# Patient Record
Sex: MALE | Race: WHITE | Employment: FULL TIME | URBAN - METROPOLITAN AREA
[De-identification: names, ages, dates, MRNs, and addresses within clinical notes are randomized per-mention and may not be internally consistent; named-entity substitution may affect disease eponyms.]

---

## 2021-06-30 ENCOUNTER — HOSPITAL ENCOUNTER (EMERGENCY)
Facility: CLINIC | Age: 36
Discharge: HOME | End: 2021-06-30
Attending: EMERGENCY MEDICINE | Admitting: EMERGENCY MEDICINE
Payer: OTHER GOVERNMENT

## 2021-06-30 ENCOUNTER — APPOINTMENT (OUTPATIENT)
Dept: CT IMAGING | Facility: CLINIC | Age: 36
End: 2021-06-30
Attending: EMERGENCY MEDICINE
Payer: OTHER GOVERNMENT

## 2021-06-30 VITALS
RESPIRATION RATE: 14 BRPM | WEIGHT: 190 LBS | OXYGEN SATURATION: 98 % | DIASTOLIC BLOOD PRESSURE: 86 MMHG | TEMPERATURE: 97 F | HEART RATE: 98 BPM | SYSTOLIC BLOOD PRESSURE: 147 MMHG

## 2021-06-30 DIAGNOSIS — H72.92 OTITIS MEDIA OF LEFT EAR WITH RUPTURE OF TYMPANIC MEMBRANE: ICD-10-CM

## 2021-06-30 DIAGNOSIS — H66.92 OTITIS MEDIA OF LEFT EAR WITH RUPTURE OF TYMPANIC MEMBRANE: ICD-10-CM

## 2021-06-30 PROCEDURE — 70480 CT ORBIT/EAR/FOSSA W/O DYE: CPT

## 2021-06-30 PROCEDURE — 99284 EMERGENCY DEPT VISIT MOD MDM: CPT | Mod: 25 | Performed by: EMERGENCY MEDICINE

## 2021-06-30 PROCEDURE — 99284 EMERGENCY DEPT VISIT MOD MDM: CPT | Performed by: EMERGENCY MEDICINE

## 2021-06-30 RX ORDER — WHEY PROTEIN ISOLATE 100 %
2 POWDER (GRAM) MISCELLANEOUS DAILY PRN
COMMUNITY

## 2021-06-30 RX ORDER — OMEPRAZOLE 20 MG/1
20 TABLET, DELAYED RELEASE ORAL DAILY
COMMUNITY

## 2021-06-30 RX ORDER — VARENICLINE TARTRATE 1 MG/1
1 TABLET, FILM COATED ORAL 2 TIMES DAILY
COMMUNITY
Start: 2021-06-02 | End: 2021-06-30

## 2021-06-30 RX ORDER — OFLOXACIN 3 MG/ML
5 SOLUTION AURICULAR (OTIC) 2 TIMES DAILY
Qty: 5 ML | Refills: 0 | Status: SHIPPED | OUTPATIENT
Start: 2021-06-30 | End: 2021-07-07

## 2021-06-30 RX ORDER — MULTIVIT-MIN/FOLIC/VIT K/LYCOP 400-300MCG
1 TABLET ORAL EVERY OTHER DAY
COMMUNITY

## 2021-06-30 RX ORDER — IBUPROFEN 200 MG
600 TABLET ORAL EVERY 6 HOURS PRN
COMMUNITY

## 2021-06-30 NOTE — ED PROVIDER NOTES
History     Chief Complaint   Patient presents with     Otalgia     HPI  Yayo Betancourt is a 35 year old male who presents with left ear pain.  It has been present for 30 days since he flew home from [place].  Early this morning around 3 AM he had sudden onset of a popping sensation and fluid drainage from the ear.  It first started draining like old water then is become mixed more with some pus.  No fever.  He does describe some pain in the left mastoid.  Pain is an 8 out of 10.    Allergies:  No Known Allergies    Problem List:    There are no active problems to display for this patient.       Past Medical History:    History reviewed. No pertinent past medical history.    Past Surgical History:    History reviewed. No pertinent surgical history.    Family History:    No family history on file.    Social History:  Marital Status:    Social History     Tobacco Use     Smoking status: Current Every Day Smoker     Packs/day: 0.50     Smokeless tobacco: Never Used   Substance Use Topics     Alcohol use: Yes     Comment: ocassional     Drug use: None        Medications:    amoxicillin-clavulanate (AUGMENTIN) 875-125 MG tablet  ibuprofen (ADVIL/MOTRIN) 200 MG tablet  Multiple Vitamin (ONE-A-DAY MENS) TABS  ofloxacin (FLOXIN) 0.3 % otic solution  omeprazole (PRILOSEC OTC) 20 MG EC tablet  varenicline (CHANTIX TIA) 0.5 MG X 11 & 1 MG X 42 tablet  varenicline (CHANTIX) 1 MG tablet          Review of Systems  All other systems are reviewed and are negative    Physical Exam   BP: (!) 147/86  Pulse: 98  Temp: 97  F (36.1  C)  Resp: 14  Weight: 86.2 kg (190 lb)  SpO2: 98 %      Physical Exam  Vitals signs reviewed.   Constitutional:       General: He is not in acute distress.     Appearance: He is not diaphoretic.   HENT:      Head: Normocephalic and atraumatic.      Right Ear: Tympanic membrane normal.      Left Ear: Drainage present. Tympanic membrane is injected and erythematous.      Ears:      Comments: Left mastoid  is tender, 8 out of 10 to palpation.  There is no erythema or obvious swelling.  Eyes:      General: No scleral icterus.        Right eye: No discharge.         Left eye: No discharge.      Conjunctiva/sclera: Conjunctivae normal.   Neck:      Musculoskeletal: Normal range of motion.   Pulmonary:      Effort: Pulmonary effort is normal.      Breath sounds: No stridor.   Musculoskeletal: Normal range of motion.   Skin:     General: Skin is warm and dry.      Findings: No rash.   Neurological:      Mental Status: He is alert.      Comments: Normal speech and mentation   Psychiatric:         Judgment: Judgment normal.         ED Course        Procedures               Critical Care time:  none               Results for orders placed or performed during the hospital encounter of 06/30/21 (from the past 24 hour(s))   CT Temporal Bones wo Contrast    Narrative    CT OF THE TEMPORAL BONES WITHOUT CONTRAST 6/30/2021 4:56 PM     HISTORY: Mastoiditis.    COMPARISON: None.    TECHNIQUE: Thin-section axial CT images of the skull base were  acquired without intravenous contrast. Thin-section coronal  reconstructions were created from the axial source images.    FINDINGS:  Right Temporal Bone: The external auditory canal and tympanic membrane  are within normal limits. There is a high rating right jugular bulb  that intersects the vestibular aqueducts; this can be associated with  sensorineural hearing loss. The auditory ossicles are normal in  contour and alignment. The middle ear cavity and mastoid air cells are  well-aerated. The internal auditory canal, cochlea, semicircular  canals, vestibular aqueduct and bony canal for the facial nerve are  within normal limits. The scutum is normal in contour. The tegmen  tympani is intact.    Left Temporal Bone:  The external auditory canal is unremarkable.  There is thickening of the tympanic membrane. The auditory ossicles  are normal in contour and alignment. There is partial  opacification of  the middle ear cavity by fluid/inflammatory debris. There is partial  opacification of the mastoid air cells by fluid/inflammatory debris.  The internal auditory canal, cochlea, semicircular canals, vestibular  aqueduct and bony canal for the facial nerve are within normal limits.  The scutum is normal in contour. The tegmen tympani is intact.      Impression    IMPRESSION:  1. High-riding right jugular bulb that intersects the right vestibular  aqueduct. This can be associated with sensory neural hearing loss.  2. Otherwise, normal right temporal bone.  3. Partial opacification of the middle ear cavity and mastoid air  cells on the left that is likely inflammatory in nature.  4. Otherwise, normal left temporal bone.      Radiation dose for this scan was reduced using automated exposure  control, adjustment of the mA and/or kV according to patient size, or  iterative reconstruction technique    KING GLYNN MD          SYSTEM ID:  [id number]       Medications - No data to display    Assessments & Plan (with Medical Decision Making)  35-year-old male with otitis media and ruptured TM.  Some mastoid tenderness but no evidence for serious sequela I based on above CT.  Case was reviewed with Dr. Gimenez.  We will place him on the below antibiotics and he will see him in clinic in ~2 weeks in follow-up.     I have reviewed the nursing notes.    I have reviewed the findings, diagnosis, plan and need for follow up with the patient.       New Prescriptions    AMOXICILLIN-CLAVULANATE (AUGMENTIN) 875-125 MG TABLET    Take 1 tablet by mouth 2 times daily    OFLOXACIN (FLOXIN) 0.3 % OTIC SOLUTION    Place 5 drops in ear(s) 2 times daily for 7 days       Final diagnoses:   Otitis media of left ear with rupture of tympanic membrane       6/30/2021   [facility] EMERGENCY DEPT     Ghulam Carbone MD  06/30/21 0292

## 2021-06-30 NOTE — ED TRIAGE NOTES
Here with left ear pain and drainage. 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 States last night he felt a pop and it started drainage.

## 2023-02-11 ENCOUNTER — HEALTH MAINTENANCE LETTER (OUTPATIENT)
Age: 38
End: 2023-02-11

## 2024-03-09 ENCOUNTER — HEALTH MAINTENANCE LETTER (OUTPATIENT)
Age: 39
End: 2024-03-09

## 2025-06-08 ENCOUNTER — HEALTH MAINTENANCE LETTER (OUTPATIENT)
Age: 40
End: 2025-06-08

## 2025-06-08 ENCOUNTER — APPOINTMENT (OUTPATIENT)
Dept: CT IMAGING | Facility: CLINIC | Age: 40
End: 2025-06-08
Attending: EMERGENCY MEDICINE
Payer: COMMERCIAL

## 2025-06-08 ENCOUNTER — HOSPITAL ENCOUNTER (EMERGENCY)
Facility: CLINIC | Age: 40
Discharge: HOME | End: 2025-06-08
Attending: EMERGENCY MEDICINE | Admitting: EMERGENCY MEDICINE
Payer: COMMERCIAL

## 2025-06-08 VITALS
TEMPERATURE: 97.4 F | HEART RATE: 81 BPM | OXYGEN SATURATION: 95 % | SYSTOLIC BLOOD PRESSURE: 117 MMHG | DIASTOLIC BLOOD PRESSURE: 70 MMHG | RESPIRATION RATE: 16 BRPM

## 2025-06-08 DIAGNOSIS — F10.929 ALCOHOLIC INTOXICATION WITH COMPLICATION: ICD-10-CM

## 2025-06-08 DIAGNOSIS — R41.82 ALTERED MENTAL STATUS, UNSPECIFIED ALTERED MENTAL STATUS TYPE: ICD-10-CM

## 2025-06-08 DIAGNOSIS — E04.1 THYROID NODULE: ICD-10-CM

## 2025-06-08 LAB
ALBUMIN SERPL BCG-MCNC: 4.7 G/DL (ref 3.5–5.2)
ALP SERPL-CCNC: 58 U/L (ref 40–150)
ALT SERPL W P-5'-P-CCNC: 33 U/L (ref 0–70)
ANION GAP SERPL CALCULATED.3IONS-SCNC: 16 MMOL/L (ref 7–15)
AST SERPL W P-5'-P-CCNC: 32 U/L (ref 0–45)
BASOPHILS # BLD AUTO: 0 10E3/UL (ref 0–0.2)
BASOPHILS NFR BLD AUTO: 0 %
BILIRUB SERPL-MCNC: <0.2 MG/DL
BUN SERPL-MCNC: 16.5 MG/DL (ref 6–20)
CALCIUM SERPL-MCNC: 8.8 MG/DL (ref 8.8–10.4)
CHLORIDE SERPL-SCNC: 103 MMOL/L (ref 98–107)
CREAT SERPL-MCNC: 0.76 MG/DL (ref 0.67–1.17)
EGFRCR SERPLBLD CKD-EPI 2021: >90 ML/MIN/1.73M2
EOSINOPHIL # BLD AUTO: 0.1 10E3/UL (ref 0–0.7)
EOSINOPHIL NFR BLD AUTO: 1 %
ERYTHROCYTE [DISTWIDTH] IN BLOOD BY AUTOMATED COUNT: 12.5 % (ref 10–15)
ETHANOL SERPL-MCNC: 0.17 G/DL
GLUCOSE SERPL-MCNC: 101 MG/DL (ref 70–99)
HCO3 SERPL-SCNC: 23 MMOL/L (ref 22–29)
HCT VFR BLD AUTO: 39.4 % (ref 40–53)
HGB BLD-MCNC: 12.9 G/DL (ref 13.3–17.7)
HOLD SPECIMEN: NORMAL
IMM GRANULOCYTES # BLD: 0 10E3/UL
IMM GRANULOCYTES NFR BLD: 0 %
LYMPHOCYTES # BLD AUTO: 1.3 10E3/UL (ref 0.8–5.3)
LYMPHOCYTES NFR BLD AUTO: 12 %
MCH RBC QN AUTO: 31.5 PG (ref 26.5–33)
MCHC RBC AUTO-ENTMCNC: 32.7 G/DL (ref 31.5–36.5)
MCV RBC AUTO: 96 FL (ref 78–100)
MONOCYTES # BLD AUTO: 0.6 10E3/UL (ref 0–1.3)
MONOCYTES NFR BLD AUTO: 5 %
NEUTROPHILS # BLD AUTO: 9.5 10E3/UL (ref 1.6–8.3)
NEUTROPHILS NFR BLD AUTO: 82 %
NRBC # BLD AUTO: 0 10E3/UL
NRBC BLD AUTO-RTO: 0 /100
PLATELET # BLD AUTO: 237 10E3/UL (ref 150–450)
POTASSIUM SERPL-SCNC: 3.9 MMOL/L (ref 3.4–5.3)
PROT SERPL-MCNC: 7 G/DL (ref 6.4–8.3)
RBC # BLD AUTO: 4.09 10E6/UL (ref 4.4–5.9)
SODIUM SERPL-SCNC: 142 MMOL/L (ref 135–145)
TROPONIN T SERPL HS-MCNC: 7 NG/L
TROPONIN T SERPL HS-MCNC: <6 NG/L
WBC # BLD AUTO: 11.5 10E3/UL (ref 4–11)

## 2025-06-08 PROCEDURE — 80053 COMPREHEN METABOLIC PANEL: CPT | Performed by: EMERGENCY MEDICINE

## 2025-06-08 PROCEDURE — 36415 COLL VENOUS BLD VENIPUNCTURE: CPT | Performed by: EMERGENCY MEDICINE

## 2025-06-08 PROCEDURE — 70450 CT HEAD/BRAIN W/O DYE: CPT

## 2025-06-08 PROCEDURE — 84484 ASSAY OF TROPONIN QUANT: CPT | Performed by: EMERGENCY MEDICINE

## 2025-06-08 PROCEDURE — 72125 CT NECK SPINE W/O DYE: CPT

## 2025-06-08 PROCEDURE — 99284 EMERGENCY DEPT VISIT MOD MDM: CPT | Mod: 25

## 2025-06-08 PROCEDURE — 82077 ASSAY SPEC XCP UR&BREATH IA: CPT | Performed by: EMERGENCY MEDICINE

## 2025-06-08 PROCEDURE — 85025 COMPLETE CBC W/AUTO DIFF WBC: CPT | Performed by: EMERGENCY MEDICINE

## 2025-06-08 RX ORDER — BUPROPION HYDROCHLORIDE 150 MG/1
TABLET, EXTENDED RELEASE ORAL
COMMUNITY
Start: 2025-02-12

## 2025-06-08 RX ORDER — DULOXETIN HYDROCHLORIDE 60 MG/1
60 CAPSULE, DELAYED RELEASE ORAL DAILY
COMMUNITY

## 2025-06-08 RX ORDER — HYDROCORTISONE 25 MG/G
CREAM TOPICAL
COMMUNITY
Start: 2024-09-18

## 2025-06-08 ASSESSMENT — ACTIVITIES OF DAILY LIVING (ADL)
ADLS_ACUITY_SCORE: 41

## 2025-06-08 ASSESSMENT — COLUMBIA-SUICIDE SEVERITY RATING SCALE - C-SSRS
6. HAVE YOU EVER DONE ANYTHING, STARTED TO DO ANYTHING, OR PREPARED TO DO ANYTHING TO END YOUR LIFE?: NO
2. HAVE YOU ACTUALLY HAD ANY THOUGHTS OF KILLING YOURSELF IN THE PAST MONTH?: NO
1. IN THE PAST MONTH, HAVE YOU WISHED YOU WERE DEAD OR WISHED YOU COULD GO TO SLEEP AND NOT WAKE UP?: NO

## 2025-06-08 NOTE — ED NOTES
Pt changed out of scrubs.  Pt helping change clothing but keeps eyes closed and only makes sounds when talked to.

## 2025-06-08 NOTE — DISCHARGE INSTRUCTIONS
Discharge Instructions  Alcohol Intoxication    You have been seen today with alcohol intoxication. This means that you have enough alcohol in your system to impair your ability to mentally and physically function, perhaps to the extent that you were unable to care for yourself.    Generally, every Emergency Department visit should have a follow-up clinic visit with either a primary or a specialty clinic/provider. Please follow-up as instructed by your emergency provider today.    You may have come to the Emergency Department because of your intoxication, or for another reason, such as because of an injury. No matter what the case is, this visit is a  red flag  regarding alcohol use, and you should consider whether your drinking pattern is a problem for you.     You may be at risk for alcohol-related problems if:    Men: you drink more than 14 drinks per week, or more than 4 drinks per occasion.    Women: you drink more than 7 drinks per week or more than 3 drinks per occasion.    You have black-outs.  You do things you regret while drinking.  You have legal problems because of drinking.  You have job problems because of drinking (you call in sick to work because of drinking).    CAGE Questions  Have you ever felt you should cut down on your drinking?  Have people annoyed you by criticizing your drinking?  Have you ever felt bad or guilty about your drinking?  Have you ever had a drink first thing in the morning to steady your nerves or get rid of a hangover (eye opener)?    If you answer yes to any of the CAGE questions, you may have a problem with alcohol.      Return to the Emergency Department if:  You become shaky or tremble when you try to stop drinking.   You have severe abdominal pain (belly pain).   You have a seizure or pass out.    You vomit (throw up) blood or have blood in your stool. This may be bright red or it may look like black coffee grounds.  You become lightheaded or faint.      For further  help, contact:   Your caregiver.    Alcoholics Anonymous (AA).    [facility] Intergroup: [phone]  [facility] Central Office: [phone]   A drug or alcohol rehabilitation program.    You can get information on alcohol resources and groups by calling the number 211 or 1-119.613.6498 on any phone.     Seek medical care if:  You have persistent vomiting.   You have persistent pain in any part of your body.    You do not feel better after a few days.    If you were given a prescription for medicine here today, be sure to read all of the information (including the package insert) that comes with your prescription.  This will include important information about the medicine, its side effects, and any warnings that you need to know about.  The pharmacist who fills the prescription can provide more information and answer questions you may have about the medicine.  If you have questions or concerns that the pharmacist cannot address, please call or return to the Emergency Department.   Remember that you can always come back to the Emergency Department if you are not able to see your regular doctor in the amount of time listed above, if you get any new symptoms, or if there is anything that worries you.    Discharge Instructions  Incidental Findings    An incidental finding is something unexpected that was found while you were being treated and is felt to not be related to the reason that you came to the Emergency Department.  While this finding is not an emergency, you need to follow up with your primary provider (or occasionally a specialist) to determine if anything should be done about it.    These findings can come from:  Checking your vital signs (example: high blood pressure).  Taking your history (example: unexplained weight loss).  The physical exam (example: a heart murmur).  Laboratory study (example: anemia or low blood count).  X-rays/ultrasound/CT or other imaging (example: an  unexplained mass).    Generally, every Emergency Department visit should have a follow-up clinic visit with either a primary or a specialty clinic/provider. Please follow-up as instructed by your emergency provider today.    Return to the Emergency Department if:  Your condition worsens.  You develop unexpected pain.  You now develop new symptoms or have new concerns.  If you were given a prescription for medicine here today, be sure to read all of the information (including the package insert) that comes with your prescription.  This will include important information about the medicine, its side effects, and any warnings that you need to know about.  The pharmacist who fills the prescription can provide more information and answer questions you may have about the medicine.  If you have questions or concerns that the pharmacist cannot address, please call or return to the Emergency Department.   Remember that you can always come back to the Emergency Department if you are not able to see your regular provider in the amount of time listed above, if you get any new symptoms, or if there is anything that worries you.

## 2025-06-08 NOTE — ED PROVIDER NOTES
8:44 AM patient steady with an ambulatory gait, clinically sober ready for discharge.  He agrees his level of alcohol consumption is not consistent with his level of mental status change, concerning for potential secondary poisoning or sedative.  I did review with him that we do not have a test to prove this though he understands he is out of the danger zone.     Marcus Otoole MD  [employer]  8:44 AM 06/08/25        Marcus Otoole MD  06/08/25 0844       Marcus Otoole MD  06/08/25 0850

## 2025-06-08 NOTE — ED NOTES
Bed: [facility]  Expected date:   Expected time:   Means of arrival:   Comments:  A543 39M ETOH, ETA [id number]

## 2025-06-08 NOTE — ED TRIAGE NOTES
Pt at hotel bar heavily intoxicated. Covered in vomit.  4 zofran given in ambulance w/ saline lock.  Pt arouses to verbal/ light touch.  VSS       Triage Assessment (Adult)       Row Name 06/08/25 0222          Triage Assessment    Airway WDL WDL        Respiratory WDL    Respiratory WDL WDL        Skin Circulation/Temperature WDL    Skin Circulation/Temperature WDL WDL        Cardiac WDL    Cardiac WDL WDL        Peripheral/Neurovascular WDL    Peripheral Neurovascular WDL WDL        Cognitive/Neuro/Behavioral WDL    Cognitive/Neuro/Behavioral WDL WDL

## 2025-06-08 NOTE — ED PROVIDER NOTES
Emergency Department Note      History of Present Illness   Chief Complaint   Alcohol Intoxication      HPI   Yayo Betancourt is a 39 year old male who presents to the ED via EMS with his friends for evaluation of altered mental status in the setting of alcohol intoxication. EMS reports patient was found slumped over on the floor of a hotel bar. Patient reportedly took 4 shots of alcohol and became very intoxicated. His friend has seen him drink but never in the state he is currently in. Patient was covered in his emesis. No blood in vomit. No falls or injuries.     Independent Historian   EMS as detailed above.      Past Medical History   Medical History and Problem List   No past medical history on file.    Medications   buPROPion (WELLBUTRIN SR) 150 MG 12 hr tablet  hydrocortisone, Perianal, (ANUSOL-HC) 2.5 % cream  amoxicillin-clavulanate (AUGMENTIN) 875-125 MG tablet  DULoxetine (CYMBALTA) 60 MG capsule  ibuprofen (ADVIL/MOTRIN) 200 MG tablet  Multiple Vitamin (ONE-A-DAY MENS) TABS  omeprazole (PRILOSEC OTC) 20 MG EC tablet  varenicline (CHANTIX TIA) 0.5 MG X 11 & 1 MG X 42 tablet  Whey Protein Isolate POWD        Physical Exam     Patient Vitals for the past 24 hrs:   BP Temp Temp src Pulse Resp SpO2   06/08/25 0217 117/70 97.4  F (36.3  C) Temporal 81 16 96 %     Physical Exam  General: Resting on the gurney, appears, somnolent, covered in emesis  Head:  The scalp, face, and head appear normal without evidence of injury  Mouth/Throat: Mucus membranes are moist  CV:  Regular rate    Normal S1 and S2  No pathological murmur   Resp:  Breath sounds clear and equal bilaterally    Non-labored, no retractions or accessory muscle use    No coarseness    No wheezing   GI:  Abdomen is soft, no rigidity  MS:  Moves all extremities equally.  No deformity or significant bruising  Skin:  No apparent lacerations or contusions.  No rash or lesions noted.  Neuro:   Somnolent.  Does not follow commands.  Does move all  extremities.  Psych: Somnolent.  Does not rouse to voice.  Unable to assess.      Diagnostics   Lab Results   Labs Ordered and Resulted from Time of ED Arrival to Time of ED Departure   COMPREHENSIVE METABOLIC PANEL - Abnormal       Result Value    Sodium 142      Potassium 3.9      Carbon Dioxide (CO2) 23      Anion Gap 16 (*)     Urea Nitrogen 16.5      Creatinine 0.76      GFR Estimate >90      Calcium 8.8      Chloride 103      Glucose 101 (*)     Alkaline Phosphatase 58      AST 32      ALT 33      Protein Total 7.0      Albumin 4.7      Bilirubin Total <0.2     ETHANOL LEVEL BLOOD - Abnormal    Ethanol Level Blood 0.17 (*)    CBC WITH PLATELETS AND DIFFERENTIAL - Abnormal    WBC Count 11.5 (*)     RBC Count 4.09 (*)     Hemoglobin 12.9 (*)     Hematocrit 39.4 (*)     MCV 96      MCH 31.5      MCHC 32.7      RDW 12.5      Platelet Count 237      % Neutrophils 82      % Lymphocytes 12      % Monocytes 5      % Eosinophils 1      % Basophils 0      % Immature Granulocytes 0      NRBCs per 100 WBC 0      Absolute Neutrophils 9.5 (*)     Absolute Lymphocytes 1.3      Absolute Monocytes 0.6      Absolute Eosinophils 0.1      Absolute Basophils 0.0      Absolute Immature Granulocytes 0.0      Absolute NRBCs 0.0     TROPONIN T, HIGH SENSITIVITY - Normal    Troponin T, High Sensitivity 7     TROPONIN T, HIGH SENSITIVITY - Normal    Troponin T, High Sensitivity <6         Imaging   CT Head w/o Contrast   Final Result   IMPRESSION:   1.  No acute intracranial process.      CT Cervical Spine w/o Contrast   Final Result   IMPRESSION:   1.  No evidence of an acute displaced fracture.   2.  There is a 15 mm nodule along the posterior right thyroid.       REFERENCE:   Beny GUPTA et al. Managing Incidental Thyroid Nodules Detected on Imaging: White Paper of the ACR Incidental Thyroid Findings Committee. JACR 2015; 12:143-150.      Incidental thyroid nodule detected on CT or MRI without suspicious findings. Applies to general  population without limited life expectancy or significant comorbidities.      Age greater than or equal to 35 years   Less than 1.5 cm: No further evaluation.   Greater than or equal to 1.5 cm: Evaluate with thyroid ultrasound.                ED Course        ED Course   ED Course as of 06/08/25 0326   Sun Jun 08, 2025   0312 I obtained history and examined the patient as noted above.    0555 patient reassessed and continues to be somnolent.  He does somewhat assist with cares but does not answer questions or open his eyes.  0710 patient signed out to my partner, Dr. Otoole, awaiting clinical sobriety and reassessment.    Medical Decision Making / Diagnosis     MDM   Yayo Betancourt is a 39 year old male who presents to the emergency department from a bar after being found slumped on the floor.  His symptoms are most consistent with intoxication, however, his level of mental status change is more than would be expected with his reported alcohol intake.  As such head CT was obtained and was reassuring, though he did have a thyroid nodule which will require follow-up.  This information was placed in his discharge paperwork.  He will continue to be observed in the emergency department awaiting sobriety as the remainder of his evaluation has been reassuring.    Disposition   Care of the patient was transferred to my colleague Dr. Otoole pending sobriety.     Diagnosis     ICD-10-CM    1. Alcoholic intoxication with complication  F10.929       2. Altered mental status, unspecified altered mental status type  R41.82       3. Thyroid nodule  E04.1          MD Camden Burnett Karah M, MD  06/08/25 0747